# Patient Record
Sex: FEMALE | Race: WHITE | NOT HISPANIC OR LATINO | URBAN - METROPOLITAN AREA
[De-identification: names, ages, dates, MRNs, and addresses within clinical notes are randomized per-mention and may not be internally consistent; named-entity substitution may affect disease eponyms.]

---

## 2021-02-18 ENCOUNTER — APPOINTMENT (RX ONLY)
Dept: URBAN - METROPOLITAN AREA CLINIC 145 | Facility: CLINIC | Age: 51
Setting detail: DERMATOLOGY
End: 2021-02-18

## 2021-02-18 PROCEDURE — ? REASON FOR LEAVING

## 2021-03-09 ENCOUNTER — APPOINTMENT (RX ONLY)
Dept: URBAN - METROPOLITAN AREA CLINIC 139 | Facility: CLINIC | Age: 51
Setting detail: DERMATOLOGY
End: 2021-03-09

## 2021-03-09 DIAGNOSIS — L98.8 OTHER SPECIFIED DISORDERS OF THE SKIN AND SUBCUTANEOUS TISSUE: ICD-10-CM

## 2021-03-09 DIAGNOSIS — L57.3 POIKILODERMA OF CIVATTE: ICD-10-CM

## 2021-03-09 DIAGNOSIS — Z41.9 ENCOUNTER FOR PROCEDURE FOR PURPOSES OTHER THAN REMEDYING HEALTH STATE, UNSPECIFIED: ICD-10-CM

## 2021-03-09 PROCEDURE — ? VI PEEL

## 2021-03-09 PROCEDURE — ? PULSED-DYE LASER

## 2021-03-09 PROCEDURE — ? ADDITIONAL NOTES

## 2021-03-09 ASSESSMENT — LOCATION DETAILED DESCRIPTION DERM
LOCATION DETAILED: LEFT INFERIOR MEDIAL FOREHEAD
LOCATION DETAILED: RIGHT INFERIOR LATERAL MALAR CHEEK
LOCATION DETAILED: LEFT ANTERIOR EARLOBE
LOCATION DETAILED: UPPER STERNUM
LOCATION DETAILED: RIGHT INFERIOR VERMILION LIP
LOCATION DETAILED: UPPER STERNUM

## 2021-03-09 ASSESSMENT — LOCATION ZONE DERM
LOCATION ZONE: TRUNK
LOCATION ZONE: TRUNK
LOCATION ZONE: FACE
LOCATION ZONE: LIP
LOCATION ZONE: EAR

## 2021-03-09 ASSESSMENT — LOCATION SIMPLE DESCRIPTION DERM
LOCATION SIMPLE: RIGHT LIP
LOCATION SIMPLE: LEFT EAR
LOCATION SIMPLE: LEFT FOREHEAD
LOCATION SIMPLE: RIGHT CHEEK
LOCATION SIMPLE: CHEST
LOCATION SIMPLE: CHEST

## 2021-03-09 NOTE — PROCEDURE: VI PEEL
Price (Use Numbers Only, No Special Characters Or $): 325.00
Treatment Number: 1
Chemical Peel: VI Peel Purify
Prep: The treated area was degreased with pre-peel acetone pad cleanser wipe and vaseline was applied for protection of mucous membranes.
Consent: Prior to the procedure, written consent was obtained and risks were reviewed, including but not limited to: redness, peeling, blistering, pigmentary change, scarring, infection, and pain. Patient is aware multiple treatments may be necessary to achieve the desired outcome.
Comments: I treated her chest area with the peel but also used some left over solution to treat her forehead and some other areas on her cheeks where she had some brown spots.  I informed her she may not have much of of peeling on he face as I only used some left over peel solution after treating her chest. She verbalized her understanding.
Post Peel Care: After the procedure, the patient was instructed not to wash the treated area for 4 hours or manually remove dead skin when the peeling process starts. Patient was instructed verbally and given the supplied written instructions on the how to use the post peel home kit to the treated area on the 1st and 2nd nights and how to care for their skin after that time.  The patient downloaded the VI Peel application on their phone to help answer any questions and to guide them on the home care process.
Post-Care Instructions: I reviewed with the patient in detail post-care instructions and the patient downloaded the VI Peel manju on their phone. The patient was given the post care kit included with the peel with detailed  instructions on how to perform each step and day and times to perform them on. The patient was instructed to avoid sun exposure and wear sun protection as instructed.\\nShe plans to return when she finishes peeling and we will possibly treat her chest for redness with IPL.
Detail Level: Zone

## 2021-03-09 NOTE — PROCEDURE: PULSED-DYE LASER
Spot Size: 7 mm
Post-Procedure Care: Vaseline and ice applied. Post care reviewed with patient.
Pulse Duration: 3 ms
Delay Time (Ms): 20
Pulse Duration: 10 ms
Price (Use Numbers Only, No Special Characters Or $): 100
Detail Level: Zone
Cryogen Time (Ms): 30
Post-Care Instructions: I reviewed with the patient in detail post-care instructions. Patient should stay away from the sun and wear sun protection until treated areas are fully healed.
Consent: Written consent obtained, risks reviewed including but not limited to crusting, scabbing, blistering, scarring, darker or lighter pigmentary change, incidental hair removal, bruising, and/or incomplete removal.
Laser Type: Vbeam 595nm

## 2021-03-09 NOTE — HPI: COSMETIC (CHEMICAL PEEL)
Have You Had A Chemical Peel Before?: has not had a previous peel
When Outside In The Sun, Do You...: mostly tans, rarely burns
Additional History: She complains of white spots on her chest that really bother her along with redness and brown spots. She just saw Dr. Ugalde to have a venous lake treated on her lower lip. He referred her to me and she states he told her that the white spots would look less if she had the redness on her chest treated and the brown spots.  She states she doesn’t think she has a tan but her chest looks like it is tanned and blanches when I pushed gently on it. \\nShe also states she has break out bumps that on her chest that she sizing used to have when she was younger.  \\nI recommended that she should first to try a VI purify peel to help rid her skin of the tan and possibly help the break outs.  I also suggested that she try a gluten free diet and then a dairy free diet to see if that will help her break outs. \\nI told her to return once she finishes peeling on her. Heat and then we can treat her with a IPL for the redness and assess if she needs further intervention for the brown spots.  She agreed to that plan and will have the VI Purify Peel today.

## 2021-03-30 ENCOUNTER — APPOINTMENT (RX ONLY)
Dept: URBAN - METROPOLITAN AREA CLINIC 139 | Facility: CLINIC | Age: 51
Setting detail: DERMATOLOGY
End: 2021-03-30

## 2021-03-30 DIAGNOSIS — L98.8 OTHER SPECIFIED DISORDERS OF THE SKIN AND SUBCUTANEOUS TISSUE: ICD-10-CM

## 2021-03-30 DIAGNOSIS — Z41.9 ENCOUNTER FOR PROCEDURE FOR PURPOSES OTHER THAN REMEDYING HEALTH STATE, UNSPECIFIED: ICD-10-CM

## 2021-03-30 PROCEDURE — ? COSMETIC CONSULTATION: SKIN CARE PRODUCTS AND SERVICES

## 2021-03-30 PROCEDURE — ? ADDITIONAL NOTES

## 2021-03-30 PROCEDURE — ? PULSED-DYE LASER

## 2021-03-30 ASSESSMENT — LOCATION DETAILED DESCRIPTION DERM
LOCATION DETAILED: LEFT MEDIAL SUPERIOR CHEST
LOCATION DETAILED: RIGHT INFERIOR VERMILION LIP

## 2021-03-30 ASSESSMENT — LOCATION ZONE DERM
LOCATION ZONE: LIP
LOCATION ZONE: TRUNK

## 2021-03-30 ASSESSMENT — LOCATION SIMPLE DESCRIPTION DERM
LOCATION SIMPLE: CHEST
LOCATION SIMPLE: RIGHT LIP

## 2021-03-30 NOTE — PROCEDURE: COSMETIC CONSULTATION: SKIN CARE PRODUCTS AND SERVICES
Detail Level: Generalized
Price (Use Numbers Only, No Special Characters Or $): 0.00
Rei Harrison  SURGERY  [address] [place] [id number]  Phone: [phone]  Fax: [phone]  Follow Up Time:     Cheng Manley  CARDIOVASCULAR DISEASE  [address] [place] [address]  Phone: [phone]  Fax: [phone]  Follow Up Time:

## 2021-03-30 NOTE — HPI: COSMETIC CONSULTATION
When Outside In The Sun, Do You...: mostly tans, rarely burns
Additional History: She is most concerned with the white spots that are. Dry visible especially when she has a tan. She had a VI Peel about two weeks ago as she had a tan and wanted IPL but we discussed doing a peel first to help lessen the tan so we could treat her with IPL. She has a tan again today and I asked her if she had been in the sun since she peeled and she said that she did go out on a boat and got a tan recently. I reminded her that she can’t have a tan to do IPL so informed her to wear sunscreen and a high neck top if she goes in the sun.